# Patient Record
Sex: FEMALE | Race: OTHER | Employment: UNEMPLOYED | URBAN - METROPOLITAN AREA
[De-identification: names, ages, dates, MRNs, and addresses within clinical notes are randomized per-mention and may not be internally consistent; named-entity substitution may affect disease eponyms.]

---

## 2017-05-02 ENCOUNTER — OFFICE VISIT (OUTPATIENT)
Dept: OBGYN CLINIC | Facility: CLINIC | Age: 47
End: 2017-05-02

## 2017-05-02 VITALS
BODY MASS INDEX: 31 KG/M2 | DIASTOLIC BLOOD PRESSURE: 72 MMHG | WEIGHT: 168 LBS | HEART RATE: 92 BPM | RESPIRATION RATE: 18 BRPM | SYSTOLIC BLOOD PRESSURE: 109 MMHG

## 2017-05-02 DIAGNOSIS — N92.6 IRREGULAR MENSES: Primary | ICD-10-CM

## 2017-05-02 DIAGNOSIS — N97.0 ANOVULATION: ICD-10-CM

## 2017-05-02 DIAGNOSIS — N94.3 PMS (PREMENSTRUAL SYNDROME): ICD-10-CM

## 2017-05-02 PROCEDURE — 99214 OFFICE O/P EST MOD 30 MIN: CPT | Performed by: OBSTETRICS & GYNECOLOGY

## 2017-05-02 RX ORDER — LEVOTHYROXINE SODIUM 150 UG/1
150 TABLET ORAL
Refills: 3 | COMMUNITY
Start: 2017-04-04

## 2017-05-02 NOTE — PROGRESS NOTES
HPI:    Patient ID: Prince Tellez is a 48year old female. HPI  Six-month follow-up on cyclic progesterone. Says that she feels much improved. She has been taking Prometrium 200 mg p.o. for 12 days every 4 weeks.   She has not been getting with

## 2017-05-03 ENCOUNTER — TELEPHONE (OUTPATIENT)
Dept: OBGYN CLINIC | Facility: CLINIC | Age: 47
End: 2017-05-03

## 2018-01-09 ENCOUNTER — OFFICE VISIT (OUTPATIENT)
Dept: OBGYN CLINIC | Facility: CLINIC | Age: 48
End: 2018-01-09

## 2018-01-09 VITALS
BODY MASS INDEX: 32 KG/M2 | SYSTOLIC BLOOD PRESSURE: 119 MMHG | HEART RATE: 89 BPM | DIASTOLIC BLOOD PRESSURE: 79 MMHG | WEIGHT: 177.63 LBS

## 2018-01-09 DIAGNOSIS — Z12.31 ENCOUNTER FOR SCREENING MAMMOGRAM FOR BREAST CANCER: ICD-10-CM

## 2018-01-09 DIAGNOSIS — Z01.419 ENCOUNTER FOR WELL WOMAN EXAM WITH ROUTINE GYNECOLOGICAL EXAM: Primary | ICD-10-CM

## 2018-01-09 PROCEDURE — 99396 PREV VISIT EST AGE 40-64: CPT | Performed by: OBSTETRICS & GYNECOLOGY

## 2018-01-09 NOTE — PROGRESS NOTES
HPI:    Patient ID: Dianne Lema is a 50year old female. HPI  Well woman  Takes cyclic Prometrium for chronic anovulation, PMS sx, and AUB. Three months were amenorrheic and last two light. Feels good on them. Wishes to continue.   Requests Packs/day: 0.00      Years: 0.00      Smokeless tobacco: Former User                     Alcohol use: Yes           0.0 oz/week     Comment: socially       PHYSICAL EXAM:    Physical Exam   Constitutional: She appears well-developed and well-nourished.    H adenopathy present. Left: No inguinal adenopathy present. Neurological: She is alert. Skin: Skin is warm and dry. No erythema. Psychiatric: She has a normal mood and affect.  Her behavior is normal. Thought content normal.   Nursing note and vi

## 2018-01-11 LAB — HPV I/H RISK 1 DNA SPEC QL NAA+PROBE: NEGATIVE

## 2018-01-12 ENCOUNTER — TELEPHONE (OUTPATIENT)
Dept: OBGYN CLINIC | Facility: CLINIC | Age: 48
End: 2018-01-12

## 2018-01-12 NOTE — TELEPHONE ENCOUNTER
See patient's medication renewal request and redirect the two prescriptions I made to her new pharmacy.

## 2018-03-01 ENCOUNTER — HOSPITAL (OUTPATIENT)
Dept: OTHER | Age: 48
End: 2018-03-01
Attending: FAMILY MEDICINE

## 2018-03-21 NOTE — TELEPHONE ENCOUNTER
From: Catherine Records  Sent: 3/20/2018 9:57 PM CDT  Subject: Medication Renewal Request    Diane Cosmenaveedjunior Fournier would like a refill of the following medications:     Sertraline HCl 50 MG Oral Tab Kirby Lakhani MD]   Patient Comment: Hello! Could you please send a New script for the increased Zoloft. We changed it to 75mg so I have been taking 1.5 of the 50 mg and now I am out of those.      Preferred pharmacy: [place] [id number]

## 2018-05-21 ENCOUNTER — TELEPHONE (OUTPATIENT)
Dept: OBGYN CLINIC | Facility: CLINIC | Age: 48
End: 2018-05-21

## 2018-07-17 ENCOUNTER — TELEPHONE (OUTPATIENT)
Dept: OBGYN CLINIC | Facility: CLINIC | Age: 48
End: 2018-07-17

## 2018-07-17 RX ORDER — SULFAMETHOXAZOLE AND TRIMETHOPRIM 800; 160 MG/1; MG/1
1 TABLET ORAL 2 TIMES DAILY
Qty: 6 TABLET | Refills: 0 | Status: SHIPPED | OUTPATIENT
Start: 2018-07-17 | End: 2018-07-20

## 2018-07-17 NOTE — TELEPHONE ENCOUNTER
[phone] (home) [phone] (work)    Bactrim sent per office protocol. LM and informed via e-mail. To call back with any further questions or concerns.

## 2018-07-17 NOTE — TELEPHONE ENCOUNTER
----- Message from [facility]. Inder sent at 7/16/2018  5:12 PM CDT -----  Regarding: Non-Urgent Medical Question  Contact: [phone]  Hi Dr. Sonia Oliveira,   I hope that this message finds you well and that you are enjoying summer.    I believe I have a ur

## 2018-07-19 ENCOUNTER — PATIENT MESSAGE (OUTPATIENT)
Dept: OBGYN CLINIC | Facility: CLINIC | Age: 48
End: 2018-07-19

## 2018-07-20 NOTE — TELEPHONE ENCOUNTER
From: Diane Loving  To:  Elaine Ji MD  Sent: 7/19/2018 10:17 AM CDT  Subject: Other    Hi Dr. Cheng Schuler, well the UTI is raging out-of-control now started the antibiotics yesterday morning, by mid afternoon I had a horrible headache ,horrible b

## 2019-08-01 ENCOUNTER — OFFICE VISIT (OUTPATIENT)
Dept: OBGYN CLINIC | Facility: CLINIC | Age: 49
End: 2019-08-01
Payer: COMMERCIAL

## 2019-08-01 ENCOUNTER — APPOINTMENT (OUTPATIENT)
Dept: LAB | Facility: HOSPITAL | Age: 49
End: 2019-08-01
Attending: OBSTETRICS & GYNECOLOGY
Payer: COMMERCIAL

## 2019-08-01 VITALS — WEIGHT: 186 LBS | SYSTOLIC BLOOD PRESSURE: 118 MMHG | BODY MASS INDEX: 34 KG/M2 | DIASTOLIC BLOOD PRESSURE: 72 MMHG

## 2019-08-01 DIAGNOSIS — N92.6 IRREGULAR MENSES: ICD-10-CM

## 2019-08-01 DIAGNOSIS — N95.1 PERIMENOPAUSE: ICD-10-CM

## 2019-08-01 DIAGNOSIS — N94.3 PREMENSTRUAL TENSION SYNDROME: ICD-10-CM

## 2019-08-01 DIAGNOSIS — Z01.419 ENCOUNTER FOR WELL WOMAN EXAM WITH ROUTINE GYNECOLOGICAL EXAM: Primary | ICD-10-CM

## 2019-08-01 DIAGNOSIS — Z12.31 ENCOUNTER FOR SCREENING MAMMOGRAM FOR BREAST CANCER: ICD-10-CM

## 2019-08-01 DIAGNOSIS — N94.3 PMS (PREMENSTRUAL SYNDROME): ICD-10-CM

## 2019-08-01 LAB
ESTRADIOL SERPL-MCNC: 94.2 PG/ML
FSH SERPL-ACNC: 5.7 MIU/ML

## 2019-08-01 PROCEDURE — 82670 ASSAY OF TOTAL ESTRADIOL: CPT

## 2019-08-01 PROCEDURE — 36415 COLL VENOUS BLD VENIPUNCTURE: CPT

## 2019-08-01 PROCEDURE — 83001 ASSAY OF GONADOTROPIN (FSH): CPT

## 2019-08-01 PROCEDURE — 99396 PREV VISIT EST AGE 40-64: CPT | Performed by: OBSTETRICS & GYNECOLOGY

## 2019-08-01 RX ORDER — CHOLECALCIFEROL (VITAMIN D3) 125 MCG
1-2 CAPSULE ORAL
COMMUNITY

## 2019-08-01 RX ORDER — DICYCLOMINE HCL 20 MG
20 TABLET ORAL
COMMUNITY
Start: 2013-05-28

## 2019-08-01 NOTE — PROGRESS NOTES
HPI:    Patient ID: Soy Locke is a 46year old female. HPI  Well woman visit  38-year-old almost 44 with a history of anovulatory oligomenorrhea, irregular cycles, premenstrual syndrome intention, mastalgia, anxiety and depression.   Has been • Paratubal cyst     small subserosal fibroids   • Status post breast reduction 2004   • Urinary incontinence    • Vaginal delivery 1998    vag vacuum      Past Surgical History:   Procedure Laterality Date   • D & C  2005, 2006   • ENDOMETRIAL ABLATION Symmetric bilaterally. Areolas without lesions. Skin- normal without growths, lesions, erythema or peau d'orange change. Nipples- without retraction or discharge. No masses, lumps, skin changes, erythema, or lesions.   Axilla-  No adenopathy, mass, or

## 2019-08-02 LAB — HPV I/H RISK 1 DNA SPEC QL NAA+PROBE: NEGATIVE

## 2020-02-26 ENCOUNTER — PATIENT MESSAGE (OUTPATIENT)
Dept: OBGYN CLINIC | Facility: CLINIC | Age: 50
End: 2020-02-26

## 2020-02-26 NOTE — TELEPHONE ENCOUNTER
Alyx Steven, RN 2/26/2020 1:20 PM CST      ----- Message -----  From: Caity Syed  Sent: 2/26/2020 1:16 PM CST  To: [facility] Clinical Staff  Subject: Prescription Question     Hi Dr. Kwame Verdugo-   I desperately need your help-   I ma

## 2020-02-27 ENCOUNTER — TELEPHONE (OUTPATIENT)
Dept: OBGYN CLINIC | Facility: CLINIC | Age: 50
End: 2020-02-27

## 2020-02-27 NOTE — TELEPHONE ENCOUNTER
----- Message from [facility].  Inder sent at 2/27/2020 10:18 AM CST -----  Regarding: RE: Prescription Question  Contact: [phone]  Delilah Negrete- spoke with the Pharmacy- they never received the script- just called the office and left a message for someon

## 2020-02-27 NOTE — TELEPHONE ENCOUNTER
[facility] Per pt will be going out of town this weekend and needs it sooner.   [facility], [facility] order to [facility] per pt request.

## 2023-12-21 ENCOUNTER — WALK IN (OUTPATIENT)
Dept: URGENT CARE | Age: 53
End: 2023-12-21
Attending: FAMILY MEDICINE

## 2023-12-21 VITALS
TEMPERATURE: 97.3 F | WEIGHT: 168 LBS | HEART RATE: 90 BPM | SYSTOLIC BLOOD PRESSURE: 114 MMHG | OXYGEN SATURATION: 97 % | HEIGHT: 62 IN | RESPIRATION RATE: 18 BRPM | DIASTOLIC BLOOD PRESSURE: 77 MMHG | BODY MASS INDEX: 30.91 KG/M2

## 2023-12-21 DIAGNOSIS — R51.9 RIGHT-SIDED HEADACHE: ICD-10-CM

## 2023-12-21 DIAGNOSIS — H92.01 RIGHT EAR PAIN: Primary | ICD-10-CM

## 2023-12-21 RX ORDER — CIPROFLOXACIN AND DEXAMETHASONE 3; 1 MG/ML; MG/ML
4 SUSPENSION/ DROPS AURICULAR (OTIC) 2 TIMES DAILY
Qty: 7.5 ML | Refills: 0 | Status: SHIPPED | OUTPATIENT
Start: 2023-12-21 | End: 2023-12-28

## 2023-12-21 RX ORDER — LEVOTHYROXINE SODIUM 150 UG/1
150 TABLET ORAL DAILY
COMMUNITY

## 2023-12-21 RX ORDER — BUPROPION HYDROCHLORIDE 150 MG/1
1 TABLET ORAL DAILY
COMMUNITY
Start: 2023-10-30

## 2023-12-21 RX ORDER — VENLAFAXINE HYDROCHLORIDE 150 MG/1
1 CAPSULE, EXTENDED RELEASE ORAL DAILY
COMMUNITY
Start: 2023-10-30

## 2023-12-21 RX ORDER — LIRAGLUTIDE 6 MG/ML
3 INJECTION, SOLUTION SUBCUTANEOUS
COMMUNITY
Start: 2023-10-06

## 2023-12-21 RX ORDER — CETIRIZINE HYDROCHLORIDE 10 MG/1
10 TABLET ORAL
COMMUNITY

## 2023-12-21 RX ORDER — CYCLOBENZAPRINE HCL 10 MG
TABLET ORAL
COMMUNITY
Start: 2023-10-30

## 2023-12-21 RX ORDER — ALPRAZOLAM 0.25 MG/1
TABLET ORAL
COMMUNITY
Start: 2023-10-06

## 2023-12-21 ASSESSMENT — PAIN SCALES - GENERAL: PAINLEVEL: 8

## (undated) NOTE — LETTER
8/6/2019              Diane Loving        [address]         Dear Raymond Nicole,      It was a pleasure to see you at our [address]  office.   Your pap and Hpv cotesting was jose

## (undated) NOTE — MR AVS SNAPSHOT
[facility]  [address] [place] [id number] [phone]               Thank you for choosing us for your health care visit with Margy Hernandez MD.  We are glad to serve you and happy to provide you with this summary of your visit. These medications were sent to [facility] [address] [address]     Phone:  [phone] - progesterone [facility] active are less likely to develop some chronic diseases than adults who are inactive.      HOW TO GET STARTED: HOW TO STAY MOTIVATED:   Start activities slowly and build up over time Do what you like   Get your heart pumping – brisk walking, biking, swimmin